# Patient Record
Sex: MALE | Race: BLACK OR AFRICAN AMERICAN | NOT HISPANIC OR LATINO | URBAN - METROPOLITAN AREA
[De-identification: names, ages, dates, MRNs, and addresses within clinical notes are randomized per-mention and may not be internally consistent; named-entity substitution may affect disease eponyms.]

---
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
## 2024-05-08 NOTE — ED PEDIATRIC NURSE NOTE - SECONDARY SEPSIS
PROGRESS NOTE      LOS: 0 days   Patient Care Team:  Lela Boyle APRN as PCP - General (Family Medicine)  Michele Chavez MD as Consulting Physician (Urology)    Chief Complaint:   Arterial occlusion, lower extremity    Subjective     Patient Complaints: Patient is resting comfortably in bed. She denies any pain. She has bounding left PTA and DPA pulses. She remains on a heparin gtt. I discussed starting her on oral anticoagulation and she has some concerns about the effects on her period which she says is very heavy a few times a year. She is set up to see her OBGYN for this and have studies done. I told her to let him know she is on anticoagulation now prior to her appointment.     Objective     Vital Signs  Temp:  [97.5 °F (36.4 °C)-98.5 °F (36.9 °C)] 97.5 °F (36.4 °C)  Heart Rate:  [62-91] 68  Resp:  [15-20] 16  BP: ()/(45-89) 102/46    Physical Exam  General-alert and oriented x 3, pleasant, no acute distress  Neck-no jugular venous distention, normally palpable bilateral carotid pulses  Cardiovascular-regular rate and rhythm  GI-no abdominal pain or distention  Extremities-she has bounding PTA and DPA pulses bilaterally   Laboratory Data:   Results from last 7 days   Lab Units 05/08/24  0602 05/07/24  1551   WBC 10*3/mm3 9.38 9.22   HEMOGLOBIN g/dL 8.4* 9.1*   HEMATOCRIT % 27.3* 28.5*   PLATELETS 10*3/mm3 268 309       Results from last 7 days   Lab Units 05/07/24  1551   SODIUM mmol/L 141   POTASSIUM mmol/L 3.9   CHLORIDE mmol/L 107   CO2 mmol/L 23.0   BUN mg/dL 12   CREATININE mg/dL 0.70   CALCIUM mg/dL 9.3   BILIRUBIN mg/dL 0.2   ALK PHOS U/L 54   ALT (SGPT) U/L 20   AST (SGOT) U/L 20   GLUCOSE mg/dL 107*     Results from last 7 days   Lab Units 05/08/24  0602 05/07/24  1639   PROTIME Seconds  --  13.4   INR   --  0.98   APTT seconds 62.1* 22.1*              Assessment & Plan       Arterial occlusion, lower extremity      Up ad kareen, no lifting over 10 pounds for a week.  Stop heparin  drip  Start Eliquis 5mg PO BID  Vascular will sign off, follow up in our office in 2 weeks.     Plan for disposition:    MELLISSA Fitzpatrick  Vascular Surgery  [phone]  05/08/24  09:05 CDT         Yes, proceed